# Patient Record
Sex: FEMALE | Race: WHITE | URBAN - METROPOLITAN AREA
[De-identification: names, ages, dates, MRNs, and addresses within clinical notes are randomized per-mention and may not be internally consistent; named-entity substitution may affect disease eponyms.]

---

## 2021-03-29 ENCOUNTER — OUT OF OFFICE VISIT (OUTPATIENT)
Dept: URBAN - METROPOLITAN AREA MEDICAL CENTER 34 | Facility: MEDICAL CENTER | Age: 71
End: 2021-03-29
Payer: MEDICARE

## 2021-03-29 DIAGNOSIS — I27.20 PULMONARY HYPERTENSION: ICD-10-CM

## 2021-03-29 DIAGNOSIS — R19.5 ABNORMAL FECES: ICD-10-CM

## 2021-03-29 DIAGNOSIS — D50.9 ANEMIA, IRON DEFICIENCY: ICD-10-CM

## 2021-03-29 PROCEDURE — G8427 DOCREV CUR MEDS BY ELIG CLIN: HCPCS | Performed by: INTERNAL MEDICINE

## 2021-03-29 PROCEDURE — 99232 SBSQ HOSP IP/OBS MODERATE 35: CPT | Performed by: INTERNAL MEDICINE

## 2021-03-29 PROCEDURE — 99222 1ST HOSP IP/OBS MODERATE 55: CPT | Performed by: INTERNAL MEDICINE

## 2021-05-19 ENCOUNTER — WEB ENCOUNTER (OUTPATIENT)
Dept: URBAN - METROPOLITAN AREA CLINIC 94 | Facility: CLINIC | Age: 71
End: 2021-05-19

## 2021-05-19 ENCOUNTER — OFFICE VISIT (OUTPATIENT)
Dept: URBAN - METROPOLITAN AREA CLINIC 94 | Facility: CLINIC | Age: 71
End: 2021-05-19
Payer: MEDICARE

## 2021-05-19 ENCOUNTER — DASHBOARD ENCOUNTERS (OUTPATIENT)
Age: 71
End: 2021-05-19

## 2021-05-19 DIAGNOSIS — D50.0 IRON DEFICIENCY ANEMIA DUE TO CHRONIC BLOOD LOSS: ICD-10-CM

## 2021-05-19 PROBLEM — 724556004: Status: ACTIVE | Noted: 2021-05-19

## 2021-05-19 PROCEDURE — 99214 OFFICE O/P EST MOD 30 MIN: CPT | Performed by: INTERNAL MEDICINE

## 2021-05-19 RX ORDER — SERTRALINE 100 MG/1
1 TABLET TABLET, FILM COATED ORAL ONCE A DAY
Status: ACTIVE | COMMUNITY

## 2021-05-19 RX ORDER — WHEELCHAIR
AS DIRECTED EACH MISCELLANEOUS
Status: ACTIVE | COMMUNITY

## 2021-05-19 RX ORDER — EXTENDED PHENYTOIN SODIUM 100 MG/1
TAKE 1 CAPSULE BY ORAL ROUTE 5 TIMES A DAY CAPSULE ORAL
Qty: 0 | Refills: 0 | Status: ON HOLD | COMMUNITY
Start: 1900-01-01

## 2021-05-19 RX ORDER — HYDROCORTISONE 25 MG/G
1 APPLICATION CREAM TOPICAL
Status: ACTIVE | COMMUNITY

## 2021-05-19 RX ORDER — CYCLOBENZAPRINE HYDROCHLORIDE 10 MG/1
1 TABLET AT BEDTIME AS NEEDED TABLET, FILM COATED ORAL PRN
Refills: 0 | Status: ACTIVE | COMMUNITY
Start: 1900-01-01

## 2021-05-19 RX ORDER — HYDROCODONE BITARTRATE AND ACETAMINOPHEN 10; 325 MG/1; MG/1
TAKE 1 TABLET BY ORAL ROUTE EVERY 12 HOURS TABLET ORAL 2
Qty: 0 | Refills: 0 | Status: ACTIVE | COMMUNITY
Start: 1900-01-01

## 2021-05-19 RX ORDER — LOSARTAN POTASSIUM 25 MG/1
TAKE 1 TABLET (25 MG) BY ORAL ROUTE ONCE DAILY TABLET, FILM COATED ORAL 1
Qty: 0 | Refills: 0 | Status: ON HOLD | COMMUNITY
Start: 1900-01-01

## 2021-05-19 RX ORDER — HYDRALAZINE HYDROCHLORIDE 25 MG/1
1 TABLET WITH FOOD TABLET, FILM COATED ORAL TWICE PER DAY
Status: ACTIVE | COMMUNITY

## 2021-05-19 RX ORDER — CYANOCOBALAMIN 1000 UG/ML
INJECT 1 MILLILITER (1,000 MCG) BY INTRAMUSCULAR ROUTE ONCE A MONTH INJECTION INTRAMUSCULAR; SUBCUTANEOUS
Refills: 0 | Status: ACTIVE | COMMUNITY
Start: 1900-01-01

## 2021-05-19 RX ORDER — ALBUTEROL SULFATE 2.5 MG/.5ML
INHALE 0.5 MILLILITER (2.5 MG) BY NEBULIZATION ROUTE EVERY 6 HOURS SOLUTION RESPIRATORY (INHALATION) PRN
Refills: 0 | Status: ACTIVE | COMMUNITY
Start: 1900-01-01

## 2021-05-19 RX ORDER — BUMETANIDE 2 MG/1
2 ML TABLET ORAL DAILY
Status: ACTIVE | COMMUNITY

## 2021-05-19 RX ORDER — OMEPRAZOLE 20 MG/1
1 CAPSULE 30 MINUTES BEFORE MORNING MEAL CAPSULE, DELAYED RELEASE ORAL ONCE A DAY
Status: ACTIVE | COMMUNITY

## 2021-05-19 RX ORDER — ATORVASTATIN CALCIUM 20 MG/1
1 TABLET TABLET, FILM COATED ORAL ONCE A DAY
Status: ACTIVE | COMMUNITY

## 2021-05-19 RX ORDER — PHENYTOIN SODIUM 100 MG/1
5 CAPSULES CAPSULE, EXTENDED RELEASE ORAL NIGHTLY
Status: ACTIVE | COMMUNITY

## 2021-05-19 RX ORDER — POTASSIUM CHLORIDE 1500 MG/1
1 TABLET WITH FOOD TABLET, EXTENDED RELEASE ORAL ONCE A DAY
Status: ACTIVE | COMMUNITY

## 2021-05-19 RX ORDER — IPRATROPIUM BROMIDE AND ALBUTEROL SULFATE .5; 2.5 MG/3ML; MG/3ML
3 ML AS NEEDED SOLUTION RESPIRATORY (INHALATION)
Status: ACTIVE | COMMUNITY

## 2021-05-19 RX ORDER — ALBUTEROL SULFATE 90 UG/1
INHALE 2 PUFFS (90 MCG) BY INHALATION ROUTE EVERY 6 HOURS AS NEEDED AEROSOL, METERED RESPIRATORY (INHALATION)
Refills: 0 | Status: ACTIVE | COMMUNITY
Start: 1900-01-01

## 2021-05-19 RX ORDER — NICOTINE 21 MG/24HR
1 PATCH TO SKIN PATCH, TRANSDERMAL 24 HOURS TRANSDERMAL ONCE A DAY
Status: ACTIVE | COMMUNITY

## 2021-05-19 RX ORDER — CHLORHEXIDINE GLUCONATE 4 %
TAKE 1 TABLET BY ORAL ROUTE DAILY LIQUID (ML) TOPICAL 1
Qty: 0 | Refills: 0 | Status: ACTIVE | COMMUNITY
Start: 1900-01-01

## 2021-05-19 RX ORDER — LEVOTHYROXINE SODIUM 0.07 MG/1
1 TABLET IN THE MORNING ON AN EMPTY STOMACH TABLET ORAL ONCE A DAY
Status: ACTIVE | COMMUNITY

## 2021-05-19 RX ORDER — SERTRALINE 100 MG/1
TAKE 1 TABLET (100 MG) BY ORAL ROUTE ONCE DAILY TABLET, FILM COATED ORAL ONCE A DAY
Refills: 0 | Status: ACTIVE | COMMUNITY
Start: 1900-01-01

## 2021-05-19 NOTE — HPI-TODAY'S VISIT:
Pt recently dx with severe pulmonary HTN. Pt recently  evaluated for TYRELL with heme + stools. Cardiology recommended deferring nonemergent GI procedures.  Hgb was 7.9 with No signs of active GI bleeding. Patient advised to f/u in outpatient GI clinic  Previous EGD/Colon 12/2018 Gastritis. Poor colon prep and Hemorrhoids Pt currently denies any GI symptoms.

## 2021-08-05 ENCOUNTER — OUT OF OFFICE VISIT (OUTPATIENT)
Dept: URBAN - METROPOLITAN AREA MEDICAL CENTER 34 | Facility: MEDICAL CENTER | Age: 71
End: 2021-08-05
Payer: MEDICARE

## 2021-08-05 DIAGNOSIS — I27.20 PULMONARY HYPERTENSION: ICD-10-CM

## 2021-08-05 DIAGNOSIS — J44.9 ADVANCED CHRONIC OBSTRUCTIVE PULMONARY DISEASE: ICD-10-CM

## 2021-08-05 DIAGNOSIS — D50.8 ACQUIRED IRON DEFICIENCY ANEMIA DUE TO DECREASED ABSORPTION: ICD-10-CM

## 2021-08-05 DIAGNOSIS — R79.89 ELEVATED TROPONIN: ICD-10-CM

## 2021-08-05 PROCEDURE — 99222 1ST HOSP IP/OBS MODERATE 55: CPT | Performed by: INTERNAL MEDICINE

## 2021-08-05 PROCEDURE — G8427 DOCREV CUR MEDS BY ELIG CLIN: HCPCS | Performed by: INTERNAL MEDICINE

## 2021-08-06 ENCOUNTER — OUT OF OFFICE VISIT (OUTPATIENT)
Dept: URBAN - METROPOLITAN AREA MEDICAL CENTER 34 | Facility: MEDICAL CENTER | Age: 71
End: 2021-08-06
Payer: MEDICARE

## 2021-08-06 DIAGNOSIS — J44.9 ADVANCED CHRONIC OBSTRUCTIVE PULMONARY DISEASE: ICD-10-CM

## 2021-08-06 DIAGNOSIS — I27.20 PULMONARY HYPERTENSION: ICD-10-CM

## 2021-08-06 DIAGNOSIS — R79.89 ELEVATED TROPONIN: ICD-10-CM

## 2021-08-06 DIAGNOSIS — D50.8 ACQUIRED IRON DEFICIENCY ANEMIA DUE TO DECREASED ABSORPTION: ICD-10-CM

## 2021-08-06 PROCEDURE — 99232 SBSQ HOSP IP/OBS MODERATE 35: CPT | Performed by: INTERNAL MEDICINE

## 2021-08-07 ENCOUNTER — OUT OF OFFICE VISIT (OUTPATIENT)
Dept: URBAN - METROPOLITAN AREA MEDICAL CENTER 34 | Facility: MEDICAL CENTER | Age: 71
End: 2021-08-07
Payer: MEDICARE

## 2021-08-07 DIAGNOSIS — D50.8 ACQUIRED IRON DEFICIENCY ANEMIA DUE TO DECREASED ABSORPTION: ICD-10-CM

## 2021-08-07 DIAGNOSIS — R79.89 ELEVATED TROPONIN: ICD-10-CM

## 2021-08-07 DIAGNOSIS — K55.21 ANGIODYSPLASIA OF COLON STATUS POST ABLATION: ICD-10-CM

## 2021-08-07 DIAGNOSIS — R19.5 ABNORMAL CONSISTENCY OF STOOL: ICD-10-CM

## 2021-08-07 DIAGNOSIS — K31.89 ACQUIRED DEFORMITY OF DUODENUM: ICD-10-CM

## 2021-08-07 DIAGNOSIS — K29.80 ACUTE DUODENITIS: ICD-10-CM

## 2021-08-07 DIAGNOSIS — K62.1 ANAL AND RECTAL POLYP: ICD-10-CM

## 2021-08-07 PROCEDURE — 45382 COLONOSCOPY W/CONTROL BLEED: CPT | Performed by: INTERNAL MEDICINE

## 2021-08-07 PROCEDURE — 43239 EGD BIOPSY SINGLE/MULTIPLE: CPT | Performed by: INTERNAL MEDICINE

## 2021-08-07 PROCEDURE — 99232 SBSQ HOSP IP/OBS MODERATE 35: CPT | Performed by: INTERNAL MEDICINE

## 2021-08-07 PROCEDURE — 45380 COLONOSCOPY AND BIOPSY: CPT | Performed by: INTERNAL MEDICINE

## 2021-08-09 ENCOUNTER — OUT OF OFFICE VISIT (OUTPATIENT)
Dept: URBAN - METROPOLITAN AREA MEDICAL CENTER 34 | Facility: MEDICAL CENTER | Age: 71
End: 2021-08-09
Payer: MEDICARE

## 2021-08-09 DIAGNOSIS — D50.8 ACQUIRED IRON DEFICIENCY ANEMIA DUE TO DECREASED ABSORPTION: ICD-10-CM

## 2021-08-09 DIAGNOSIS — R79.89 ELEVATED TROPONIN: ICD-10-CM

## 2021-08-09 DIAGNOSIS — K55.21 ANGIODYSPLASIA OF COLON STATUS POST ABLATION: ICD-10-CM

## 2021-08-09 DIAGNOSIS — R19.5 ABNORMAL CONSISTENCY OF STOOL: ICD-10-CM

## 2021-08-09 PROCEDURE — 99232 SBSQ HOSP IP/OBS MODERATE 35: CPT | Performed by: INTERNAL MEDICINE

## 2021-11-20 ENCOUNTER — OUT OF OFFICE VISIT (OUTPATIENT)
Dept: URBAN - METROPOLITAN AREA MEDICAL CENTER 34 | Facility: MEDICAL CENTER | Age: 71
End: 2021-11-20
Payer: MEDICARE

## 2021-11-20 DIAGNOSIS — I27.20 PULMONARY HYPERTENSION: ICD-10-CM

## 2021-11-20 DIAGNOSIS — Z87.19 H/O ACUTE PANCREATITIS: ICD-10-CM

## 2021-11-20 DIAGNOSIS — D50.8 ACQUIRED IRON DEFICIENCY ANEMIA DUE TO DECREASED ABSORPTION: ICD-10-CM

## 2021-11-20 DIAGNOSIS — J44.1 CHRONIC OBSTRUCTIVE PULMONARY DISEASE WITH (ACUTE) EXACERBATION: ICD-10-CM

## 2021-11-20 PROCEDURE — 99233 SBSQ HOSP IP/OBS HIGH 50: CPT | Performed by: INTERNAL MEDICINE
